# Patient Record
Sex: MALE | Race: WHITE
[De-identification: names, ages, dates, MRNs, and addresses within clinical notes are randomized per-mention and may not be internally consistent; named-entity substitution may affect disease eponyms.]

---

## 2018-06-13 ENCOUNTER — HOSPITAL ENCOUNTER (EMERGENCY)
Dept: HOSPITAL 80 - FED | Age: 23
Discharge: HOME | End: 2018-06-13
Payer: MEDICAID

## 2018-06-13 VITALS — SYSTOLIC BLOOD PRESSURE: 147 MMHG | DIASTOLIC BLOOD PRESSURE: 69 MMHG

## 2018-06-13 DIAGNOSIS — S62.316A: Primary | ICD-10-CM
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
## 2018-06-13 DIAGNOSIS — S80.211A: ICD-10-CM
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
## 2018-06-13 DIAGNOSIS — S60.811A: ICD-10-CM
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
## 2018-06-13 DIAGNOSIS — F17.200: ICD-10-CM

## 2018-06-13 DIAGNOSIS — S50.811A: ICD-10-CM

## 2018-06-13 DIAGNOSIS — S40.211A: ICD-10-CM

## 2018-06-13 PROCEDURE — 2W3CX1Z IMMOBILIZATION OF RIGHT LOWER ARM USING SPLINT: ICD-10-PCS
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
